# Patient Record
Sex: FEMALE | Race: WHITE | NOT HISPANIC OR LATINO | URBAN - METROPOLITAN AREA
[De-identification: names, ages, dates, MRNs, and addresses within clinical notes are randomized per-mention and may not be internally consistent; named-entity substitution may affect disease eponyms.]

---

## 2017-04-18 ENCOUNTER — OUTPATIENT (OUTPATIENT)
Dept: OUTPATIENT SERVICES | Facility: HOSPITAL | Age: 57
LOS: 1 days | Discharge: HOME | End: 2017-04-18

## 2017-06-27 DIAGNOSIS — M81.0 AGE-RELATED OSTEOPOROSIS WITHOUT CURRENT PATHOLOGICAL FRACTURE: ICD-10-CM

## 2017-06-27 DIAGNOSIS — E55.9 VITAMIN D DEFICIENCY, UNSPECIFIED: ICD-10-CM

## 2017-06-27 DIAGNOSIS — R73.01 IMPAIRED FASTING GLUCOSE: ICD-10-CM

## 2017-06-27 DIAGNOSIS — E06.9 THYROIDITIS, UNSPECIFIED: ICD-10-CM

## 2017-06-27 DIAGNOSIS — E78.00 PURE HYPERCHOLESTEROLEMIA, UNSPECIFIED: ICD-10-CM

## 2017-09-26 ENCOUNTER — OUTPATIENT (OUTPATIENT)
Dept: OUTPATIENT SERVICES | Facility: HOSPITAL | Age: 57
LOS: 1 days | Discharge: HOME | End: 2017-09-26

## 2017-09-26 DIAGNOSIS — Z00.00 ENCOUNTER FOR GENERAL ADULT MEDICAL EXAMINATION WITHOUT ABNORMAL FINDINGS: ICD-10-CM

## 2018-04-10 ENCOUNTER — OUTPATIENT (OUTPATIENT)
Dept: OUTPATIENT SERVICES | Facility: HOSPITAL | Age: 58
LOS: 1 days | Discharge: HOME | End: 2018-04-10

## 2018-04-10 DIAGNOSIS — E55.9 VITAMIN D DEFICIENCY, UNSPECIFIED: ICD-10-CM

## 2018-04-10 DIAGNOSIS — E78.00 PURE HYPERCHOLESTEROLEMIA, UNSPECIFIED: ICD-10-CM

## 2018-04-10 DIAGNOSIS — E06.3 AUTOIMMUNE THYROIDITIS: ICD-10-CM

## 2018-04-10 DIAGNOSIS — M81.0 AGE-RELATED OSTEOPOROSIS WITHOUT CURRENT PATHOLOGICAL FRACTURE: ICD-10-CM

## 2018-08-01 ENCOUNTER — OUTPATIENT (OUTPATIENT)
Dept: OUTPATIENT SERVICES | Facility: HOSPITAL | Age: 58
LOS: 1 days | Discharge: HOME | End: 2018-08-01

## 2018-08-02 DIAGNOSIS — Z78.0 ASYMPTOMATIC MENOPAUSAL STATE: ICD-10-CM

## 2018-08-02 DIAGNOSIS — Z09 ENCOUNTER FOR FOLLOW-UP EXAMINATION AFTER COMPLETED TREATMENT FOR CONDITIONS OTHER THAN MALIGNANT NEOPLASM: ICD-10-CM

## 2018-08-02 DIAGNOSIS — M81.0 AGE-RELATED OSTEOPOROSIS WITHOUT CURRENT PATHOLOGICAL FRACTURE: ICD-10-CM

## 2018-10-29 ENCOUNTER — OUTPATIENT (OUTPATIENT)
Dept: OUTPATIENT SERVICES | Facility: HOSPITAL | Age: 58
LOS: 1 days | Discharge: HOME | End: 2018-10-29

## 2018-10-29 DIAGNOSIS — E78.5 HYPERLIPIDEMIA, UNSPECIFIED: ICD-10-CM

## 2018-10-29 DIAGNOSIS — M81.0 AGE-RELATED OSTEOPOROSIS WITHOUT CURRENT PATHOLOGICAL FRACTURE: ICD-10-CM

## 2018-10-29 DIAGNOSIS — E79.0 HYPERURICEMIA WITHOUT SIGNS OF INFLAMMATORY ARTHRITIS AND TOPHACEOUS DISEASE: ICD-10-CM

## 2018-10-29 DIAGNOSIS — R73.01 IMPAIRED FASTING GLUCOSE: ICD-10-CM

## 2019-03-14 ENCOUNTER — OUTPATIENT (OUTPATIENT)
Dept: OUTPATIENT SERVICES | Facility: HOSPITAL | Age: 59
LOS: 1 days | Discharge: HOME | End: 2019-03-14

## 2019-03-14 DIAGNOSIS — E78.2 MIXED HYPERLIPIDEMIA: ICD-10-CM

## 2019-03-14 DIAGNOSIS — E11.9 TYPE 2 DIABETES MELLITUS WITHOUT COMPLICATIONS: ICD-10-CM

## 2019-03-14 DIAGNOSIS — E03.9 HYPOTHYROIDISM, UNSPECIFIED: ICD-10-CM

## 2019-03-14 DIAGNOSIS — D64.9 ANEMIA, UNSPECIFIED: ICD-10-CM

## 2019-06-19 ENCOUNTER — OUTPATIENT (OUTPATIENT)
Dept: OUTPATIENT SERVICES | Facility: HOSPITAL | Age: 59
LOS: 1 days | Discharge: HOME | End: 2019-06-19

## 2019-06-19 DIAGNOSIS — M81.0 AGE-RELATED OSTEOPOROSIS WITHOUT CURRENT PATHOLOGICAL FRACTURE: ICD-10-CM

## 2019-06-19 DIAGNOSIS — E06.3 AUTOIMMUNE THYROIDITIS: ICD-10-CM

## 2020-08-22 ENCOUNTER — OUTPATIENT (OUTPATIENT)
Dept: OUTPATIENT SERVICES | Facility: HOSPITAL | Age: 60
LOS: 1 days | Discharge: HOME | End: 2020-08-22

## 2020-08-24 DIAGNOSIS — Z09 ENCOUNTER FOR FOLLOW-UP EXAMINATION AFTER COMPLETED TREATMENT FOR CONDITIONS OTHER THAN MALIGNANT NEOPLASM: ICD-10-CM

## 2020-08-24 DIAGNOSIS — M81.0 AGE-RELATED OSTEOPOROSIS WITHOUT CURRENT PATHOLOGICAL FRACTURE: ICD-10-CM

## 2020-08-24 DIAGNOSIS — Z78.0 ASYMPTOMATIC MENOPAUSAL STATE: ICD-10-CM

## 2021-12-14 ENCOUNTER — APPOINTMENT (OUTPATIENT)
Dept: HEMATOLOGY ONCOLOGY | Facility: CLINIC | Age: 61
End: 2021-12-14
Payer: COMMERCIAL

## 2021-12-14 ENCOUNTER — LABORATORY RESULT (OUTPATIENT)
Age: 61
End: 2021-12-14

## 2021-12-14 ENCOUNTER — TRANSCRIPTION ENCOUNTER (OUTPATIENT)
Age: 61
End: 2021-12-14

## 2021-12-14 VITALS
HEIGHT: 62 IN | DIASTOLIC BLOOD PRESSURE: 74 MMHG | RESPIRATION RATE: 18 BRPM | BODY MASS INDEX: 23 KG/M2 | HEART RATE: 71 BPM | WEIGHT: 125 LBS | TEMPERATURE: 97.9 F | SYSTOLIC BLOOD PRESSURE: 136 MMHG

## 2021-12-14 DIAGNOSIS — Z00.00 ENCOUNTER FOR GENERAL ADULT MEDICAL EXAMINATION W/OUT ABNORMAL FINDINGS: ICD-10-CM

## 2021-12-14 DIAGNOSIS — Z80.1 FAMILY HISTORY OF MALIGNANT NEOPLASM OF TRACHEA, BRONCHUS AND LUNG: ICD-10-CM

## 2021-12-14 DIAGNOSIS — F17.200 NICOTINE DEPENDENCE, UNSPECIFIED, UNCOMPLICATED: ICD-10-CM

## 2021-12-14 DIAGNOSIS — Z86.39 PERSONAL HISTORY OF OTHER ENDOCRINE, NUTRITIONAL AND METABOLIC DISEASE: ICD-10-CM

## 2021-12-14 DIAGNOSIS — I10 ESSENTIAL (PRIMARY) HYPERTENSION: ICD-10-CM

## 2021-12-14 PROCEDURE — 99214 OFFICE O/P EST MOD 30 MIN: CPT

## 2021-12-14 RX ORDER — AMLODIPINE BESYLATE 5 MG/1
TABLET ORAL
Refills: 0 | Status: ACTIVE | COMMUNITY

## 2021-12-14 RX ORDER — LEVOTHYROXINE SODIUM 137 UG/1
TABLET ORAL
Refills: 0 | Status: ACTIVE | COMMUNITY

## 2021-12-15 LAB
HCT VFR BLD CALC: 37.9 %
HGB BLD-MCNC: 12.9 G/DL
MCHC RBC-ENTMCNC: 30.4 PG
MCHC RBC-ENTMCNC: 34 G/DL
MCV RBC AUTO: 89.4 FL
PLATELET # BLD AUTO: 381 K/UL
PMV BLD: 8.8 FL
RBC # BLD: 4.24 M/UL
RBC # FLD: 14.5 %
WBC # FLD AUTO: 8.9 K/UL

## 2021-12-17 NOTE — HISTORY OF PRESENT ILLNESS
[de-identified] : 61 y o f with h/o htn and hypothyroidism was referred to us for evaluation of elevated platelet count by Dr Swann . Pt reports that recent blood work done with Dr Swann this year showed elevated platelets . Pt had CBC in September that showed Hb 13.8 , wbc 10.8 and Platelets 539 . Pt then had repeat blood work twice in October that showed  wbc 11.2, hb 12.7 and platelets  455 , repeat blood work a wk later showed wbc 10.8, hb  12.6  and platelets 426 . So pt was referred to us for further evaluation . Pt reports feeling fine , denies any bleeding or bruising , no h/o CAD or CVA , denies headaches or blurry vision , no joint pains or autoimmune disorders. Review of blood work available in records suggested normal cbc from  till 2019 . Elevated platelets were only seen this year . \par Pt is post menopausal , denies any vaginal bleeding or spotting . Never had a colonoscopy done . She is a current every day smoker , never had a screening CT chest done. \par Pt denies any h/o cough , hemoptysis or weight loss. \par Pt has family h/o lung cancer in both parents , mother  of lung cancer in her 80s and father  of lung cancer in late 70s. \par Pt is uptodate with mammogram, last mammogram was in 10/2020 and it was fine as per pt .

## 2021-12-17 NOTE — ASSESSMENT
[FreeTextEntry1] : 61 y o f referred to us for evaluation of thrombocytosis .\par \par # Fluctuating Thrombocytosis noted on blood work this year , previous blood work showed normal platelet counts\par Could be reactive vrs MPN ( elevated WBC and platelets on 3 occasions ) \par --discussed with pt , will repeat cbc today . If cbc shows increased platelet count , will recommend blood work to r/o MPN , including STEFANIA 2, MPL, CALR and BCR-ABL . \par --CBC today in office showed normal wbc 8.9 ,  hb of 12.9 and platelets  389 . \par --will repeat cbc in 2 months , if stable will continue to monitor , if noted to be abnormal , will recommend above mentioned w/u . \par --Pt will also referred for low dose screening CT chest for her  family h/o lung cancer . \par --Pt recommended to get colonoscopy by GI and f/u with GYN for age appropriate screening and evaluation . \par --pt demonstrated understanding and was encouraged to ask questions . \par RTC in 2 months.\par \par Pt was seen and examined with Dr Rice .\par \par

## 2021-12-17 NOTE — PHYSICAL EXAM
[Fully active, able to carry on all pre-disease performance without restriction] : Status 0 - Fully active, able to carry on all pre-disease performance without restriction [Thin] : thin [Normal] : normal appearance, no rash, nodules, vesicles, ulcers, erythema [de-identified] : very pleasant female

## 2022-02-15 ENCOUNTER — LABORATORY RESULT (OUTPATIENT)
Age: 62
End: 2022-02-15

## 2022-02-15 ENCOUNTER — OUTPATIENT (OUTPATIENT)
Dept: OUTPATIENT SERVICES | Facility: HOSPITAL | Age: 62
LOS: 1 days | Discharge: HOME | End: 2022-02-15

## 2022-02-15 ENCOUNTER — APPOINTMENT (OUTPATIENT)
Dept: HEMATOLOGY ONCOLOGY | Facility: CLINIC | Age: 62
End: 2022-02-15
Payer: COMMERCIAL

## 2022-02-15 VITALS
DIASTOLIC BLOOD PRESSURE: 76 MMHG | SYSTOLIC BLOOD PRESSURE: 108 MMHG | HEIGHT: 62 IN | HEART RATE: 68 BPM | RESPIRATION RATE: 18 BRPM | TEMPERATURE: 97.9 F | OXYGEN SATURATION: 98 % | BODY MASS INDEX: 23.55 KG/M2 | WEIGHT: 128 LBS

## 2022-02-15 DIAGNOSIS — D75.839 THROMBOCYTOSIS, UNSPECIFIED: ICD-10-CM

## 2022-02-15 DIAGNOSIS — Z00.00 ENCOUNTER FOR GENERAL ADULT MEDICAL EXAMINATION WITHOUT ABNORMAL FINDINGS: ICD-10-CM

## 2022-02-15 LAB
HCT VFR BLD CALC: 35.9 %
HGB BLD-MCNC: 12.2 G/DL
MCHC RBC-ENTMCNC: 30.4 PG
MCHC RBC-ENTMCNC: 34 G/DL
MCV RBC AUTO: 89.5 FL
PLATELET # BLD AUTO: 221 K/UL
PMV BLD: 10.3 FL
RBC # BLD: 4.01 M/UL
RBC # FLD: 14.4 %
RETICS # AUTO: 1.1 %
RETICS AGGREG/RBC NFR: 45.6 K/UL
WBC # FLD AUTO: 8.24 K/UL

## 2022-02-15 PROCEDURE — 99213 OFFICE O/P EST LOW 20 MIN: CPT

## 2022-02-16 PROBLEM — D75.839 THROMBOCYTOSIS: Status: ACTIVE | Noted: 2021-12-14

## 2022-02-16 LAB
FERRITIN SERPL-MCNC: 90 NG/ML
VIT B12 SERPL-MCNC: 725 PG/ML

## 2022-02-16 NOTE — ASSESSMENT
[FreeTextEntry1] : 61 year old female with history of mild thrombocytosis , now resolved , likely reactive ? \par Hgb slightly decreased from baseline . \par Plan : retic , ferritin , B12 \par          encouraged to have surveillance colonoscopy \par          monitor CBC every 6 months .

## 2022-02-16 NOTE — HISTORY OF PRESENT ILLNESS
[de-identified] : \par 61 y o f with h/o htn and hypothyroidism was referred to us for evaluation of elevated platelet count by Dr Swann. Pt reports that recent blood work done with Dr Swann this year showed elevated platelets. Pt had CBC in September that showed Hb 13.8 , wbc 10.8 and Platelets 539. Pt then had repeat blood work twice in October that showed wbc 11.2, hb 12.7 and platelets 455 , repeat blood work a wk later showed wbc 10.8, hb 12.6 and platelets 426. So pt was referred to us for further evaluation. Pt reports feeling fine , denies any bleeding or bruising , no h/o CAD or CVA , denies headaches or blurry vision , no joint pains or autoimmune disorders. Review of blood work available in records suggested normal cbc from  till 2019. Elevated platelets were only seen this year. \par Pt is post menopausal , denies any vaginal bleeding or spotting. Never had a colonoscopy done. She is a current every day smoker , never had a screening CT chest done. \par Pt denies any h/o cough , hemoptysis or weight loss. \par Pt has family h/o lung cancer in both parents , mother  of lung cancer in her 80s and father  of lung cancer in late 70s. \par Pt is uptodate with mammogram, last mammogram was in 10/2020 and it was fine as per pt. \par \par  [de-identified] : 2/15/2022 Patient returns for mild thrombocytosis noted for 1 year . On her last visit platelets were only borderline high . She remains totally asymptomatic .

## 2022-02-23 LAB — METHYLMALONATE SERPL-SCNC: 137 NMOL/L

## 2022-09-14 ENCOUNTER — OUTPATIENT (OUTPATIENT)
Dept: OUTPATIENT SERVICES | Facility: HOSPITAL | Age: 62
LOS: 1 days | Discharge: HOME | End: 2022-09-14

## 2022-09-19 DIAGNOSIS — Z09 ENCOUNTER FOR FOLLOW-UP EXAMINATION AFTER COMPLETED TREATMENT FOR CONDITIONS OTHER THAN MALIGNANT NEOPLASM: ICD-10-CM

## 2022-09-19 DIAGNOSIS — Z78.0 ASYMPTOMATIC MENOPAUSAL STATE: ICD-10-CM

## 2022-09-19 DIAGNOSIS — M89.9 DISORDER OF BONE, UNSPECIFIED: ICD-10-CM

## 2023-10-12 ENCOUNTER — NON-APPOINTMENT (OUTPATIENT)
Age: 63
End: 2023-10-12

## 2024-10-01 ENCOUNTER — OUTPATIENT (OUTPATIENT)
Dept: OUTPATIENT SERVICES | Facility: HOSPITAL | Age: 64
LOS: 1 days | End: 2024-10-01
Payer: MEDICARE

## 2024-10-01 DIAGNOSIS — Z12.31 ENCOUNTER FOR SCREENING MAMMOGRAM FOR MALIGNANT NEOPLASM OF BREAST: ICD-10-CM

## 2024-10-01 DIAGNOSIS — Z13.820 ENCOUNTER FOR SCREENING FOR OSTEOPOROSIS: ICD-10-CM

## 2024-10-01 PROCEDURE — 77080 DXA BONE DENSITY AXIAL: CPT | Mod: 26

## 2024-10-01 PROCEDURE — 77080 DXA BONE DENSITY AXIAL: CPT

## 2024-10-02 DIAGNOSIS — Z13.820 ENCOUNTER FOR SCREENING FOR OSTEOPOROSIS: ICD-10-CM

## 2024-10-07 DIAGNOSIS — M81.0 AGE-RELATED OSTEOPOROSIS WITHOUT CURRENT PATHOLOGICAL FRACTURE: ICD-10-CM
